# Patient Record
Sex: MALE | Race: WHITE | Employment: FULL TIME | URBAN - METROPOLITAN AREA
[De-identification: names, ages, dates, MRNs, and addresses within clinical notes are randomized per-mention and may not be internally consistent; named-entity substitution may affect disease eponyms.]

---

## 2019-07-23 ENCOUNTER — HOSPITAL ENCOUNTER (EMERGENCY)
Facility: CLINIC | Age: 34
Discharge: HOME | End: 2019-07-23
Attending: EMERGENCY MEDICINE | Admitting: EMERGENCY MEDICINE
Payer: COMMERCIAL

## 2019-07-23 VITALS
HEIGHT: 77 IN | OXYGEN SATURATION: 100 % | WEIGHT: 242 LBS | RESPIRATION RATE: 15 BRPM | DIASTOLIC BLOOD PRESSURE: 71 MMHG | TEMPERATURE: 98 F | BODY MASS INDEX: 28.57 KG/M2 | SYSTOLIC BLOOD PRESSURE: 131 MMHG | HEART RATE: 74 BPM

## 2019-07-23 DIAGNOSIS — T78.40XA ALLERGIC REACTION, INITIAL ENCOUNTER: ICD-10-CM

## 2019-07-23 PROCEDURE — 96375 TX/PRO/DX INJ NEW DRUG ADDON: CPT

## 2019-07-23 PROCEDURE — 25000128 H RX IP 250 OP 636: Performed by: EMERGENCY MEDICINE

## 2019-07-23 PROCEDURE — 99285 EMERGENCY DEPT VISIT HI MDM: CPT | Mod: 25

## 2019-07-23 PROCEDURE — 96374 THER/PROPH/DIAG INJ IV PUSH: CPT

## 2019-07-23 PROCEDURE — 96361 HYDRATE IV INFUSION ADD-ON: CPT

## 2019-07-23 PROCEDURE — 96372 THER/PROPH/DIAG INJ SC/IM: CPT

## 2019-07-23 RX ORDER — EPINEPHRINE 0.3 MG/.3ML
0.3 INJECTION SUBCUTANEOUS
Qty: 1 EACH | Refills: 0 | Status: SHIPPED | OUTPATIENT
Start: 2019-07-23 | End: 2019-09-13

## 2019-07-23 RX ORDER — DIPHENHYDRAMINE HYDROCHLORIDE 50 MG/ML
50 INJECTION INTRAMUSCULAR; INTRAVENOUS ONCE
Status: COMPLETED | OUTPATIENT
Start: 2019-07-23 | End: 2019-07-23

## 2019-07-23 RX ORDER — EPINEPHRINE 1 MG/ML
.3-.5 INJECTION, SOLUTION, CONCENTRATE INTRAVENOUS ONCE
Status: COMPLETED | OUTPATIENT
Start: 2019-07-23 | End: 2019-07-23

## 2019-07-23 RX ORDER — METHYLPREDNISOLONE SODIUM SUCCINATE 125 MG/2ML
125 INJECTION, POWDER, LYOPHILIZED, FOR SOLUTION INTRAMUSCULAR; INTRAVENOUS ONCE
Status: COMPLETED | OUTPATIENT
Start: 2019-07-23 | End: 2019-07-23

## 2019-07-23 RX ORDER — PREDNISONE 20 MG/1
TABLET ORAL
Qty: 10 TABLET | Refills: 0 | Status: SHIPPED | OUTPATIENT
Start: 2019-07-23 | End: 2019-09-13

## 2019-07-23 RX ADMIN — DIPHENHYDRAMINE HYDROCHLORIDE 50 MG: 50 INJECTION, SOLUTION INTRAMUSCULAR; INTRAVENOUS at 11:45

## 2019-07-23 RX ADMIN — EPINEPHRINE 0.36 MG: 1 INJECTION INTRAMUSCULAR; INTRAVENOUS; SUBCUTANEOUS at 11:40

## 2019-07-23 RX ADMIN — METHYLPREDNISOLONE SODIUM SUCCINATE 125 MG: 125 INJECTION, POWDER, FOR SOLUTION INTRAMUSCULAR; INTRAVENOUS at 11:49

## 2019-07-23 RX ADMIN — SODIUM CHLORIDE 1000 ML: 9 INJECTION, SOLUTION INTRAVENOUS at 11:51

## 2019-07-23 ASSESSMENT — MIFFLIN-ST. JEOR: SCORE: 2165.08

## 2019-07-23 ASSESSMENT — ENCOUNTER SYMPTOMS
TROUBLE SWALLOWING: 0
DIARRHEA: 0
SHORTNESS OF BREATH: 0
NAUSEA: 1
VOMITING: 0

## 2019-07-23 NOTE — DISCHARGE INSTRUCTIONS
Discharge Instructions  Allergic Reaction    An allergic reaction can result in a rash, itching, swelling, watery eyes, or a runny nose. A serious reaction can cause swelling of your mouth or throat, or difficulty breathing (wheezing). The most serious allergy is called anaphylaxis, and can be life-threatening. Many allergies result in hives, also called urticaria.       An allergy happens when the body s natural defense system (immune system) overreacts to something. The thing that triggers your allergic reaction is called an allergen. The first time you are exposed to your allergen, you may not have any reaction, but the body makes a protein called an antibody. The antibody lets the body recognize and remember the allergen.  Every time you are exposed to your allergen you get more antibody and your reaction can be more severe.      Generally, every Emergency Department visit should have a follow-up clinic visit with either a primary or a specialty clinic/provider. Please follow-up as instructed by your emergency provider today.    Call 911 if you have:  Swelling of the lips, tongue or throat.  Hoarse voice, drooling or trouble breathing.  Chest pain or shortness of breath.  Fainting or unconsciousness.    What can I do to help myself?  If you know what caused your allergy, do not touch it, throw any of it away, and tell others not to have it around you. Wear a medical alert bracelet with a name of your allergen on it.  If you do not know what you are allergic to, keep a journal of everything that you are exposed to (foods, soaps, medicines, etc.). Take this with you when you follow up with your primary provider or specialist (Allergist). This may help determine what is causing the allergic reaction.  Take any medicines that are prescribed.  Antihistamines can decrease rash or itching. You may use Benadryl  (diphenhydramine) for rash or itching according to package directions, or use a prescription antihistamine as  recommended by your provider.  For significant allergic reactions, you may have been given a prescription for an epinephrine (adrenaline) auto injector. Carry this with you at all times! Use it if you are having any symptoms of anaphylaxis.  Do not be afraid to use it. Return to the Emergency Department if you use your auto injector, call 911 if it does not resolve the symptoms. It is only meant to buy time until you can get to the Emergency Department!  If you were given a prescription for medicine here today, be sure to read all of the information (including the package insert) that comes with your prescription.  This will include important information about the medicine, its side effects, and any warnings that you need to know about.  The pharmacist who fills the prescription can provide more information and answer questions you may have about the medicine.  If you have questions or concerns that the pharmacist cannot address, please call or return to the Emergency Department.   Remember that you can always come back to the Emergency Department if you are not able to see your regular provider in the amount of time listed above, if you get any new symptoms, or if there is anything that worries you.

## 2019-07-23 NOTE — ED TRIAGE NOTES
Patient has been on Pantoprazole for one week.  Noted swelling to tongue and difficulty swallowing over the past 24 hours.      ABCs intact.  Alert and oriented x 3.

## 2019-07-23 NOTE — ED AVS SNAPSHOT
[facility] Emergency Department  [address]  [facility] [address]  Phone:  [phone]  Fax:  [phone]                                    Giovanni Parks   MRN: [id number]    Department:  [facility] Emergency Department   Date of Visit:  7/23/2019           After Visit Summary Signature Page    I have received my discharge instructions, and my questions have been answered. I have discussed any challenges I see with this plan with the nurse or doctor.    ..........................................................................................................................................  Patient/Patient Representative Signature      ..........................................................................................................................................  Patient Representative Print Name and Relationship to Patient    ..................................................               ................................................  Date                                   Time    ..........................................................................................................................................  Reviewed by Signature/Title    ...................................................              ..............................................  Date                                               Time          22EPIC Rev 08/18

## 2019-07-23 NOTE — ED PROVIDER NOTES
"  History     Chief Complaint:    Allergic Reaction      HPI   Giovanni Parks is a 33 year old male who presents to the ED for evaluation of a suspected allergic reaction. The patient states that he started taking pantoprazole a week ago for persistent GERD symptoms. About 24 hours ago, he states that he started to develop slight swelling in his tongue and a mild discomfort in his throat. His symptoms persisted today so he presented to the ED for concerns that he may be having an allergic reaction to his pantoprazole. He also complains of some nausea. He denies any difficulty swallowing, shortness of breath, vomiting, or diarrhea. He notes that he went camping this past weekend but otherwise denies using/consuming any new foods, soaps, lotions, or other potential allergens. Patient reports that he does not take ACE inhibitors.     Allergies:  The patient has no known drug allergies.     Medications:    Pantoprazole  Zantac    Past Medical History:    GERD  HLD  Lumbar disc disease  Cervical disc disease    Past Surgical History:    C7 disc fusion    Family History:    Father: HLD, HTN  Mother: DM, COPD, HTN    Social History:  Negative for tobacco use.  Negative for alcohol use.  Marital Status:        Review of Systems   HENT: Negative for trouble swallowing.         Tongue swelling   Respiratory: Negative for shortness of breath.    Gastrointestinal: Positive for nausea. Negative for diarrhea and vomiting.   All other systems reviewed and are negative.    Physical Exam   First Vitals:  BP: (!) 138/91  Pulse: 76  Heart Rate: 73  Temp: 98  F (36.7  C)  Resp: 20  Height: 195.6 cm (6' 5\")  Weight: 109.8 kg (242 lb)  SpO2: 100 %      Physical Exam    VS: Reviewed per above  HENT: Mucous membranes moist, no stridor, no nuchal rigidity, tolerating secretions, normal phonation, no objective tongue or posterior oropharyngeal swelling  EYES: sclera anicteric  CV: Rate as noted,  regular rhythm.   RESP: Effort " normal. Breath sounds are normal bilaterally.  GI: no tenderness/rebound/guarding, not distended.  NEURO: Alert, moving all extremities  MSK: No deformity of the extremities  SKIN: Warm and dry.  No rash.    Emergency Department Course   Interventions:  1140 Adrenaline 0.36 mg IM  1145 Benadryl 50 mg IV  1148 Zantac 50 mg IV  1149 Solu-medrol 125 mg IV  1151 NS 1,000 mLs IV    Emergency Department Course:  Nursing notes and vitals reviewed. (1128) I performed an exam of the patient as documented above.     IV inserted. Medicine administered as documented above.     1243 I rechecked the patient.    Findings and plan explained to the Patient. Patient discharged home with instructions regarding supportive care, medications, and reasons to return. The importance of close follow-up was reviewed. The patient was prescribed epinephrine and prednisone.     I personally reviewed the laboratory results with the Patient and answered all related questions prior to discharge.     Impression & Plan    Medical Decision Making:  Patient presents to the ER due to concern of tongue swelling, throat tightness, nausea, dizziness.  Initial vital signs within normal limits.  I do not objectively see any signs of angioedema or oropharyngeal swelling however given patient concern, I did treat this as significant allergic reaction versus anaphylaxis.  Given potential for airway involvement as well as GI symptoms and possible orthostasis, he would meet criteria for anaphylaxis.  Nevertheless he had easy work of breathing and pretty benign appearance on exam.  He was given IM epinephrine, IV Benadryl/Zantac/Solu-Medrol/IV fluids.  Symptoms improved after these interventions.  Offered to observe for 6 hours in the rare event of rebound reaction however he preferred to go home with EpiPen and prednisone prescriptions and return via EMS should symptoms worsen.  Close return precautions discussed prior to discharge      Diagnosis:    ICD-10-CM     1. Allergic reaction, initial encounter T78.40XA        Disposition:  discharged to home    Discharge Medications:     Medication List      Started    EPINEPHrine 0.3 MG/0.3ML injection 2-pack  Commonly known as:  EPIPEN/ADRENACLICK/or ANY BX GENERIC EQUIV  0.3 mg, Intramuscular, ONCE PRN     predniSONE 20 MG tablet  Commonly known as:  DELTASONE  Take two tablets (= 40mg) each day for 5 (five) days            Scribe Disclosure:  I,  Bridger Avila, am serving as a scribe on 7/23/2019 at 11:28 AM to personally document services performed by Vinny Melendez MD based on my observations and the provider's statements to me.        Bridger Avila  7/23/2019   [facility] EMERGENCY DEPARTMENT       Vinny Melendez MD  07/23/19 2843

## 2019-07-23 NOTE — LETTER
07/23/19      To Whom it may concern:    Liz Parks was in our Emergency Department today, 07/23/19 with a patient who needed their assistance.  Please excuse them from work.      Sincerely,

## 2019-07-23 NOTE — ED NOTES
"Pt stating that inflammation in throat/mouth \"is pretty much gone.\" No interventions needed at this time.  "

## 2019-07-25 ENCOUNTER — HOSPITAL ENCOUNTER (EMERGENCY)
Facility: CLINIC | Age: 34
Discharge: HOME | End: 2019-07-25
Attending: EMERGENCY MEDICINE | Admitting: EMERGENCY MEDICINE
Payer: COMMERCIAL

## 2019-07-25 VITALS
BODY MASS INDEX: 29.2 KG/M2 | DIASTOLIC BLOOD PRESSURE: 87 MMHG | SYSTOLIC BLOOD PRESSURE: 135 MMHG | RESPIRATION RATE: 16 BRPM | TEMPERATURE: 98.3 F | HEART RATE: 64 BPM | OXYGEN SATURATION: 99 % | WEIGHT: 246.25 LBS

## 2019-07-25 DIAGNOSIS — R10.12 ABDOMINAL PAIN, LEFT UPPER QUADRANT: ICD-10-CM

## 2019-07-25 DIAGNOSIS — R42 DIZZINESS: ICD-10-CM

## 2019-07-25 LAB
ALBUMIN SERPL-MCNC: 4.2 G/DL (ref 3.4–5)
ALP SERPL-CCNC: 43 U/L (ref 40–150)
ALT SERPL W P-5'-P-CCNC: 26 U/L (ref 0–70)
ANION GAP SERPL CALCULATED.3IONS-SCNC: 5 MMOL/L (ref 3–14)
AST SERPL W P-5'-P-CCNC: 12 U/L (ref 0–45)
BASOPHILS # BLD AUTO: 0 10E9/L (ref 0–0.2)
BASOPHILS NFR BLD AUTO: 0.1 %
BILIRUB SERPL-MCNC: 0.5 MG/DL (ref 0.2–1.3)
BUN SERPL-MCNC: 12 MG/DL (ref 7–30)
CALCIUM SERPL-MCNC: 9.1 MG/DL (ref 8.5–10.1)
CHLORIDE SERPL-SCNC: 108 MMOL/L (ref 94–109)
CO2 SERPL-SCNC: 27 MMOL/L (ref 20–32)
CREAT SERPL-MCNC: 0.89 MG/DL (ref 0.66–1.25)
DIFFERENTIAL METHOD BLD: ABNORMAL
EOSINOPHIL # BLD AUTO: 0 10E9/L (ref 0–0.7)
EOSINOPHIL NFR BLD AUTO: 0 %
ERYTHROCYTE [DISTWIDTH] IN BLOOD BY AUTOMATED COUNT: 12.7 % (ref 10–15)
GFR SERPL CREATININE-BSD FRML MDRD: >90 ML/MIN/{1.73_M2}
GLUCOSE SERPL-MCNC: 103 MG/DL (ref 70–99)
HCT VFR BLD AUTO: 41.7 % (ref 40–53)
HGB BLD-MCNC: 13.7 G/DL (ref 13.3–17.7)
IMM GRANULOCYTES # BLD: 0 10E9/L (ref 0–0.4)
IMM GRANULOCYTES NFR BLD: 0.4 %
LIPASE SERPL-CCNC: 161 U/L (ref 73–393)
LYMPHOCYTES # BLD AUTO: 1.3 10E9/L (ref 0.8–5.3)
LYMPHOCYTES NFR BLD AUTO: 12.4 %
MCH RBC QN AUTO: 29.7 PG (ref 26.5–33)
MCHC RBC AUTO-ENTMCNC: 32.9 G/DL (ref 31.5–36.5)
MCV RBC AUTO: 91 FL (ref 78–100)
MONOCYTES # BLD AUTO: 0.4 10E9/L (ref 0–1.3)
MONOCYTES NFR BLD AUTO: 4 %
NEUTROPHILS # BLD AUTO: 8.8 10E9/L (ref 1.6–8.3)
NEUTROPHILS NFR BLD AUTO: 83.1 %
NRBC # BLD AUTO: 0 10*3/UL
NRBC BLD AUTO-RTO: 0 /100
PLATELET # BLD AUTO: 285 10E9/L (ref 150–450)
POTASSIUM SERPL-SCNC: 4.2 MMOL/L (ref 3.4–5.3)
PROT SERPL-MCNC: 7.3 G/DL (ref 6.8–8.8)
RBC # BLD AUTO: 4.61 10E12/L (ref 4.4–5.9)
SODIUM SERPL-SCNC: 140 MMOL/L (ref 133–144)
TROPONIN I SERPL-MCNC: <0.015 UG/L (ref 0–0.04)
WBC # BLD AUTO: 10.6 10E9/L (ref 4–11)

## 2019-07-25 PROCEDURE — 85025 COMPLETE CBC W/AUTO DIFF WBC: CPT | Performed by: EMERGENCY MEDICINE

## 2019-07-25 PROCEDURE — 96361 HYDRATE IV INFUSION ADD-ON: CPT

## 2019-07-25 PROCEDURE — 25000132 ZZH RX MED GY IP 250 OP 250 PS 637: Performed by: EMERGENCY MEDICINE

## 2019-07-25 PROCEDURE — 25000128 H RX IP 250 OP 636: Performed by: EMERGENCY MEDICINE

## 2019-07-25 PROCEDURE — 96374 THER/PROPH/DIAG INJ IV PUSH: CPT

## 2019-07-25 PROCEDURE — 83690 ASSAY OF LIPASE: CPT | Performed by: EMERGENCY MEDICINE

## 2019-07-25 PROCEDURE — 99284 EMERGENCY DEPT VISIT MOD MDM: CPT | Mod: 25

## 2019-07-25 PROCEDURE — 93005 ELECTROCARDIOGRAM TRACING: CPT

## 2019-07-25 PROCEDURE — 84484 ASSAY OF TROPONIN QUANT: CPT | Performed by: EMERGENCY MEDICINE

## 2019-07-25 PROCEDURE — 80053 COMPREHEN METABOLIC PANEL: CPT | Performed by: EMERGENCY MEDICINE

## 2019-07-25 PROCEDURE — 25000125 ZZHC RX 250: Performed by: EMERGENCY MEDICINE

## 2019-07-25 RX ADMIN — LIDOCAINE HYDROCHLORIDE 30 ML: 20 SOLUTION ORAL; TOPICAL at 16:06

## 2019-07-25 RX ADMIN — FAMOTIDINE 20 MG: 10 INJECTION, SOLUTION INTRAVENOUS at 15:14

## 2019-07-25 RX ADMIN — SODIUM CHLORIDE 1000 ML: 9 INJECTION, SOLUTION INTRAVENOUS at 14:58

## 2019-07-25 ASSESSMENT — ENCOUNTER SYMPTOMS
PALPITATIONS: 1
LIGHT-HEADEDNESS: 1
ABDOMINAL PAIN: 1
BLOOD IN STOOL: 0
ABDOMINAL DISTENTION: 1
VOMITING: 0
NAUSEA: 1

## 2019-07-25 NOTE — ED AVS SNAPSHOT
[facility] Emergency Department  [address]  [facility] [address]  Phone:  [phone]  Fax:  [phone]                                    Giovanni Parks   MRN: [id number]    Department:  [facility] Emergency Department   Date of Visit:  7/25/2019           After Visit Summary Signature Page    I have received my discharge instructions, and my questions have been answered. I have discussed any challenges I see with this plan with the nurse or doctor.    ..........................................................................................................................................  Patient/Patient Representative Signature      ..........................................................................................................................................  Patient Representative Print Name and Relationship to Patient    ..................................................               ................................................  Date                                   Time    ..........................................................................................................................................  Reviewed by Signature/Title    ...................................................              ..............................................  Date                                               Time          22EPIC Rev 08/18

## 2019-07-25 NOTE — ED PROVIDER NOTES
History     Chief Complaint:  Lightheadedness & Abdominal Pain    HPI   Giovanni Parks is a 33 year old male, with a history of GERD, who presents to the ED for evaluation of lightheadedness and abdominal pain. The patient reports he developed sudden onset of lightheadedness with palpitations and felt flushed while at work today occurring in conjunction with worsening abdominal pain. Abdominal pain that is located to his LUQ, higher than usual today. His abdomen feels swollen with associated nausea. He only had cheese crackers and yogurt for lunch today. He last had one beer last night. The patient notes he has been following with Dr. Paige Lockhart, of GI, for the past 1.5 months. He has had a CT and upper endoscopy performed. He also has lost about 10lb since the onset of his abdominal pain due to being unable to eat or drink because this worsens the pain. He did call the GI office and has updated Dr. Lockhart's assistant today. The patient denies any chest pain, vomiting, black/tarry stool, blood in stool, or urinary symptoms. He denies history of abdominal surgeries. On review of patient's record, he has been following with GI for GERD. His last appointment was on 7/5 where he was prescribed Pantoprazole. If he felt that his symptoms improve, the Pantoprazole dosage would be reduced. He was advised to try Gas-X for recurrent symptoms. He was evaluated at [facility] ED 2 days ago for an allergic reaction to Pantoprazole after taking it for less than a week. He was prescribed 20mg Prednisone and Epipen. He has not had another reaction since stopping the Pantoprazole.     CT Abdomen Pelvis w Contrast, 6/7/2019  CONCLUSION:   1.  Normal examination of the abdomen and pelvis.    GI Upper Endoscopy, 6/21/2019  Impression: Z-line irregular, 44 cm from the incisors. Biopsied. Erythematous mucosa in the antrum. Biopsied. Normal examined duodenum. Biopsied.  Lionel Ocasio MD    Allergies:  Norco: GI disturbance     Levofloxacin: bilateral knee pain & locking   Protonix: swelling & difficulty breathing      Medications:    Zantac   Protonix   Valium  Prednisone   Ativan  Imitrex     Past Medical History:    GERD  HLD  Lumbar disc disease  Cervical disc disease  MRSA    Past Surgical History:    Cervical fusion, C7    Family History:    HLD, HTN: father   Diabetes, COPD, HTN: mother     Social History:  Smoking status: Never smoker    Alcohol use: Yes, one beer last night   Presents to ED alone    Marital Status:   [2]     Review of Systems   Cardiovascular: Positive for palpitations. Negative for chest pain.   Gastrointestinal: Positive for abdominal distention, abdominal pain and nausea. Negative for blood in stool and vomiting.   Neurological: Positive for light-headedness.   All other systems reviewed and are negative.    Physical Exam     Patient Vitals for the past 24 hrs:   BP Temp Temp src Pulse Heart Rate Resp SpO2 Weight   07/25/19 1700 -- -- -- -- -- 16 -- --   07/25/19 1630 135/87 -- -- 64 -- -- 99 % --   07/25/19 1615 128/85 -- -- 71 -- -- -- --   07/25/19 1600 132/80 -- -- 66 -- -- 98 % --   07/25/19 1545 127/82 -- -- 57 64 -- 98 % --   07/25/19 1530 129/76 -- -- 56 -- -- 99 % --   07/25/19 1515 131/83 -- -- 70 70 24 97 % --   07/25/19 1500 139/85 -- -- 66 -- -- 100 % --   07/25/19 1416 (!) 141/100 98.3  F (36.8  C) Temporal -- 95 18 100 % 111.7 kg (246 lb 4.1 oz)     Physical Exam  General:              Well-nourished              Speaking in full sentences  Eyes:              Conjunctiva without injection or scleral icterus  ENT:              Moist mucous membranes              Nares patent              Pinnae normal  Neck:              Full ROM              No stiffness appreciated  Resp:              Lungs CTAB              No crackles, wheezing or audible rubs              Good air movement  CV:                    Normal rate, regular rhythm              S1 and S2 present              No murmur,  gallop or rub  GI:              BS present              Abdomen soft without distention              Tenderness to palpation to LUQ              No RUQ or epigastric tenderness              No lower abdominal tenderness              No guarding or rebound tenderness  Skin:              Warm, dry, well perfused              No rashes or open wounds on exposed skin  MSK:              Moves all extremities              No focal deformities or swelling  Neuro:              Alert              Answers questions appropriately              Moves all extremities equally              Gait stable  Psych:              Normal affect, normal mood    Emergency Department Course     ECG (14:44:51):  Rate 72 bpm. NJ interval 142. QRS duration 100. QT/QTc 384/420. P-R-T axes 43 26 35. Normal sinus rhythm. Normal ECG. No significant change compared to EKG dated 3/28/12. Interpreted at 1503 by Arnie Jacobo MD.    Laboratory:  Laboratory findings were communicated with the patient who voiced understanding of the findings.    Troponin I (1457): <0.015    Lipase: 161    CBC: o/w WNL (WBC 10.6, HGB 13.7, )  CMP: Glucose 103(H), o/w WNL (Creatinine 0.89)     Interventions:  1458: NS 1L Bolus IV  1514: Pepcid 20mg IV  1606: GI Cocktail - Mylanta/Maalox 15 mL, Viscous Lidocaine 2% 15 mL, 30 mLs PO    Emergency Department Course:  Past medical records, nursing notes, and vitals reviewed.  1455: I performed an exam of the patient and obtained history, as documented above.    1457: IV inserted and blood drawn.     1555: I rechecked the patient. Explained findings to patient.    1638: Per nurse s report, the patient got up from bed and reports he feels fine. He is eating crackers.      1653: I rechecked the patient. Patient reports he has an appointment with GI tomorrow.     Findings and plan explained to the Patient. Patient discharged home with instructions regarding supportive care, medications, and reasons to return. The  importance of close follow-up was reviewed.     Impression & Plan      Medical Decision Making:  Giovanni Parks is a 33-year-old male presenting to the emergency department for evaluation of abdominal pain and dizziness.  VS on presentation reveal elevated BP, though otherwise are unremarkable.  Previous records reviewed including gastroenterology follow-up through [facility] where patient has been diagnosed with reflux esophagitis as well as left upper quadrant pain likely secondary to reflux.  He is undergone EGD as well as CT of the abdomen pelvis.  With regards to patient's symptoms, he notes exacerbation of pain to a relatively similar location and of similar character to prior flares.  Acknowledging his recent history, I am concerned his current exacerbation may be secondary to use of prednisone for treatment of his recent allergic reaction.  In the absence of ongoing allergic symptoms, as well as given the duration of time elapsed since his initial reaction, understanding the risks and benefits of this medication, I feel this can be safely discontinued and may alleviate some of his symptoms.  He has previously been intolerant of pantoprazole, for which I suggested he try Zantac.  He was also asked to try Gas-X at the recommendation of gastroenterology.  Following the above interventions in the ER, he was noting improvements in symptoms.  Repeat abdominal exam is soft without peritoneal findings and at this time, with reasonable clinical certainty I feel further advanced imaging with CT scan can be deferred safely.  His current exam is not consistent with perforation nor obstruction.  He has not noted the presence of any blood in his stool.  Labs demonstrate unremarkable hemoglobin, and unremarkable LFTs.  Lipase within normal limits arguing against pancreatitis.  With regards to patient's dizziness, this may be secondary to increased pain versus volume depletion in the setting of limited oral intake.   After IV fluids, he was noting significant improvement in symptoms.  EKG demonstrates sinus rhythm without findings of acute dysrhythmia, prolonged QTC, Brugada syndrome, no evidence of WPW.  He was amatory in the ED independently.  He was able to tolerate p.o.  Results and clinical impression were discussed with the patient.  At this time, I do feel he is stable for discharge home with close gastroenterology follow-up.  He will return to the ER with worsening pain, vomiting, inability to tolerate p.o., or any other concerns.  All of his questions were answered prior to discharge.    Diagnosis:    ICD-10-CM   1. Abdominal pain, left upper quadrant R10.12   2. Dizziness R42     Disposition: Patient discharged to home     Bettie Mathis  7/25/2019   [facility] EMERGENCY DEPARTMENT    Scribe Disclosure:  I, Bettie Mathis, am serving as a scribe at 2:55 PM on 7/25/2019 to document services personally performed by Arnie Jacobo MD based on my observations and the provider's statements to me.        Arnie Jacobo MD  07/25/19 1490

## 2019-07-25 NOTE — ED TRIAGE NOTES
"A&O x4, ABCs intact. Pt presents with left upper abdominal pain, and states \"I feel like I'm going to faint.\" Pt denies chest pain. Pt also has nausea. States he is SOB.  "

## 2019-07-25 NOTE — ED NOTES
Pt ambulated to restroom independently with standby assist. Reports improved dizziness, denies lightheadedness. Steady gait.

## 2019-07-26 LAB — INTERPRETATION ECG - MUSE: NORMAL

## 2019-09-13 ENCOUNTER — HOSPITAL ENCOUNTER (INPATIENT)
Facility: CLINIC | Age: 34
LOS: 2 days | Discharge: HOME | DRG: 872 | End: 2019-09-15
Attending: EMERGENCY MEDICINE | Admitting: INTERNAL MEDICINE
Payer: COMMERCIAL

## 2019-09-13 ENCOUNTER — APPOINTMENT (OUTPATIENT)
Dept: ULTRASOUND IMAGING | Facility: CLINIC | Age: 34
DRG: 872 | End: 2019-09-13
Attending: EMERGENCY MEDICINE
Payer: COMMERCIAL

## 2019-09-13 ENCOUNTER — APPOINTMENT (OUTPATIENT)
Dept: CT IMAGING | Facility: CLINIC | Age: 34
DRG: 872 | End: 2019-09-13
Attending: EMERGENCY MEDICINE
Payer: COMMERCIAL

## 2019-09-13 DIAGNOSIS — A41.51 SEPSIS DUE TO ESCHERICHIA COLI (H): Primary | ICD-10-CM

## 2019-09-13 DIAGNOSIS — N12 PYELONEPHRITIS: ICD-10-CM

## 2019-09-13 PROBLEM — A41.9 SEPSIS (H): Status: ACTIVE | Noted: 2019-09-13

## 2019-09-13 LAB
ALBUMIN SERPL-MCNC: 4.1 G/DL (ref 3.4–5)
ALBUMIN UR-MCNC: 30 MG/DL
ALP SERPL-CCNC: 54 U/L (ref 40–150)
ALT SERPL W P-5'-P-CCNC: 28 U/L (ref 0–70)
ANION GAP SERPL CALCULATED.3IONS-SCNC: 4 MMOL/L (ref 3–14)
APPEARANCE UR: ABNORMAL
AST SERPL W P-5'-P-CCNC: 13 U/L (ref 0–45)
BASOPHILS # BLD AUTO: 0 10E9/L (ref 0–0.2)
BASOPHILS NFR BLD AUTO: 0.2 %
BILIRUB DIRECT SERPL-MCNC: 0.1 MG/DL (ref 0–0.2)
BILIRUB SERPL-MCNC: 0.9 MG/DL (ref 0.2–1.3)
BILIRUB UR QL STRIP: NEGATIVE
BUN SERPL-MCNC: 10 MG/DL (ref 7–30)
CALCIUM SERPL-MCNC: 8.8 MG/DL (ref 8.5–10.1)
CHLORIDE SERPL-SCNC: 104 MMOL/L (ref 94–109)
CO2 SERPL-SCNC: 29 MMOL/L (ref 20–32)
COLOR UR AUTO: YELLOW
CREAT SERPL-MCNC: 1.09 MG/DL (ref 0.66–1.25)
DIFFERENTIAL METHOD BLD: ABNORMAL
EOSINOPHIL # BLD AUTO: 0 10E9/L (ref 0–0.7)
EOSINOPHIL NFR BLD AUTO: 0.1 %
ERYTHROCYTE [DISTWIDTH] IN BLOOD BY AUTOMATED COUNT: 12.4 % (ref 10–15)
GFR SERPL CREATININE-BSD FRML MDRD: 89 ML/MIN/{1.73_M2}
GLUCOSE SERPL-MCNC: 107 MG/DL (ref 70–99)
GLUCOSE UR STRIP-MCNC: NEGATIVE MG/DL
HCT VFR BLD AUTO: 42.2 % (ref 40–53)
HGB BLD-MCNC: 14.3 G/DL (ref 13.3–17.7)
HGB UR QL STRIP: ABNORMAL
IMM GRANULOCYTES # BLD: 0.1 10E9/L (ref 0–0.4)
IMM GRANULOCYTES NFR BLD: 0.5 %
KETONES UR STRIP-MCNC: NEGATIVE MG/DL
LACTATE SERPL-SCNC: 0.8 MMOL/L (ref 0.4–2)
LEUKOCYTE ESTERASE UR QL STRIP: ABNORMAL
LIPASE SERPL-CCNC: 178 U/L (ref 73–393)
LYMPHOCYTES # BLD AUTO: 1.4 10E9/L (ref 0.8–5.3)
LYMPHOCYTES NFR BLD AUTO: 7.8 %
MCH RBC QN AUTO: 30.2 PG (ref 26.5–33)
MCHC RBC AUTO-ENTMCNC: 33.9 G/DL (ref 31.5–36.5)
MCV RBC AUTO: 89 FL (ref 78–100)
MONOCYTES # BLD AUTO: 1 10E9/L (ref 0–1.3)
MONOCYTES NFR BLD AUTO: 5.2 %
MUCOUS THREADS #/AREA URNS LPF: PRESENT /LPF
NEUTROPHILS # BLD AUTO: 15.6 10E9/L (ref 1.6–8.3)
NEUTROPHILS NFR BLD AUTO: 86.2 %
NITRATE UR QL: NEGATIVE
NRBC # BLD AUTO: 0 10*3/UL
NRBC BLD AUTO-RTO: 0 /100
PH UR STRIP: 6.5 PH (ref 5–7)
PLATELET # BLD AUTO: 269 10E9/L (ref 150–450)
POTASSIUM SERPL-SCNC: 3.7 MMOL/L (ref 3.4–5.3)
PROT SERPL-MCNC: 7.4 G/DL (ref 6.8–8.8)
RBC # BLD AUTO: 4.73 10E12/L (ref 4.4–5.9)
RBC #/AREA URNS AUTO: 132 /HPF (ref 0–2)
SODIUM SERPL-SCNC: 137 MMOL/L (ref 133–144)
SOURCE: ABNORMAL
SP GR UR STRIP: 1.02 (ref 1–1.03)
SQUAMOUS #/AREA URNS AUTO: 1 /HPF (ref 0–1)
TRANS CELLS #/AREA URNS HPF: 1 /HPF (ref 0–1)
UROBILINOGEN UR STRIP-MCNC: NORMAL MG/DL (ref 0–2)
WBC # BLD AUTO: 18.2 10E9/L (ref 4–11)
WBC #/AREA URNS AUTO: >182 /HPF (ref 0–5)

## 2019-09-13 PROCEDURE — 25000131 ZZH RX MED GY IP 250 OP 636 PS 637: Performed by: INTERNAL MEDICINE

## 2019-09-13 PROCEDURE — 99285 EMERGENCY DEPT VISIT HI MDM: CPT | Mod: 25

## 2019-09-13 PROCEDURE — 87491 CHLMYD TRACH DNA AMP PROBE: CPT | Performed by: EMERGENCY MEDICINE

## 2019-09-13 PROCEDURE — 96365 THER/PROPH/DIAG IV INF INIT: CPT

## 2019-09-13 PROCEDURE — 36415 COLL VENOUS BLD VENIPUNCTURE: CPT | Performed by: EMERGENCY MEDICINE

## 2019-09-13 PROCEDURE — 87591 N.GONORRHOEAE DNA AMP PROB: CPT | Performed by: EMERGENCY MEDICINE

## 2019-09-13 PROCEDURE — 25000128 H RX IP 250 OP 636: Performed by: EMERGENCY MEDICINE

## 2019-09-13 PROCEDURE — 99222 1ST HOSP IP/OBS MODERATE 55: CPT | Mod: AI | Performed by: INTERNAL MEDICINE

## 2019-09-13 PROCEDURE — 96361 HYDRATE IV INFUSION ADD-ON: CPT

## 2019-09-13 PROCEDURE — 96375 TX/PRO/DX INJ NEW DRUG ADDON: CPT

## 2019-09-13 PROCEDURE — 25800030 ZZH RX IP 258 OP 636: Performed by: INTERNAL MEDICINE

## 2019-09-13 PROCEDURE — 83605 ASSAY OF LACTIC ACID: CPT | Performed by: EMERGENCY MEDICINE

## 2019-09-13 PROCEDURE — 87086 URINE CULTURE/COLONY COUNT: CPT | Performed by: EMERGENCY MEDICINE

## 2019-09-13 PROCEDURE — 25000128 H RX IP 250 OP 636: Performed by: INTERNAL MEDICINE

## 2019-09-13 PROCEDURE — 83690 ASSAY OF LIPASE: CPT | Performed by: EMERGENCY MEDICINE

## 2019-09-13 PROCEDURE — 87088 URINE BACTERIA CULTURE: CPT | Performed by: EMERGENCY MEDICINE

## 2019-09-13 PROCEDURE — 93976 VASCULAR STUDY: CPT

## 2019-09-13 PROCEDURE — 12000000 ZZH R&B MED SURG/OB

## 2019-09-13 PROCEDURE — 85025 COMPLETE CBC W/AUTO DIFF WBC: CPT | Performed by: EMERGENCY MEDICINE

## 2019-09-13 PROCEDURE — 87186 SC STD MICRODIL/AGAR DIL: CPT | Performed by: EMERGENCY MEDICINE

## 2019-09-13 PROCEDURE — 80048 BASIC METABOLIC PNL TOTAL CA: CPT | Performed by: EMERGENCY MEDICINE

## 2019-09-13 PROCEDURE — 74176 CT ABD & PELVIS W/O CONTRAST: CPT

## 2019-09-13 PROCEDURE — 25000132 ZZH RX MED GY IP 250 OP 250 PS 637: Performed by: EMERGENCY MEDICINE

## 2019-09-13 PROCEDURE — 87040 BLOOD CULTURE FOR BACTERIA: CPT | Performed by: EMERGENCY MEDICINE

## 2019-09-13 PROCEDURE — 80076 HEPATIC FUNCTION PANEL: CPT | Performed by: EMERGENCY MEDICINE

## 2019-09-13 PROCEDURE — 81001 URINALYSIS AUTO W/SCOPE: CPT | Performed by: EMERGENCY MEDICINE

## 2019-09-13 RX ORDER — ACETAMINOPHEN 500 MG
500 TABLET ORAL EVERY 4 HOURS PRN
Status: DISCONTINUED | OUTPATIENT
Start: 2019-09-13 | End: 2019-09-13

## 2019-09-13 RX ORDER — CEFTRIAXONE 1 G/1
1 INJECTION, POWDER, FOR SOLUTION INTRAMUSCULAR; INTRAVENOUS ONCE
Status: COMPLETED | OUTPATIENT
Start: 2019-09-13 | End: 2019-09-13

## 2019-09-13 RX ORDER — IBUPROFEN 600 MG/1
600 TABLET, FILM COATED ORAL EVERY 6 HOURS PRN
Status: DISCONTINUED | OUTPATIENT
Start: 2019-09-13 | End: 2019-09-15 | Stop reason: HOSPADM

## 2019-09-13 RX ORDER — LIDOCAINE 40 MG/G
CREAM TOPICAL
Status: DISCONTINUED | OUTPATIENT
Start: 2019-09-13 | End: 2019-09-14

## 2019-09-13 RX ORDER — SODIUM CHLORIDE 9 MG/ML
INJECTION, SOLUTION INTRAVENOUS CONTINUOUS
Status: DISCONTINUED | OUTPATIENT
Start: 2019-09-13 | End: 2019-09-15

## 2019-09-13 RX ORDER — KETOROLAC TROMETHAMINE 15 MG/ML
15 INJECTION, SOLUTION INTRAMUSCULAR; INTRAVENOUS ONCE
Status: COMPLETED | OUTPATIENT
Start: 2019-09-13 | End: 2019-09-13

## 2019-09-13 RX ORDER — BISACODYL 10 MG
10 SUPPOSITORY, RECTAL RECTAL DAILY PRN
Status: DISCONTINUED | OUTPATIENT
Start: 2019-09-13 | End: 2019-09-15 | Stop reason: HOSPADM

## 2019-09-13 RX ORDER — AMOXICILLIN 250 MG
1 CAPSULE ORAL 2 TIMES DAILY PRN
Status: DISCONTINUED | OUTPATIENT
Start: 2019-09-13 | End: 2019-09-15 | Stop reason: HOSPADM

## 2019-09-13 RX ORDER — ACETAMINOPHEN 500 MG
1000 TABLET ORAL ONCE
Status: DISCONTINUED | OUTPATIENT
Start: 2019-09-13 | End: 2019-09-13

## 2019-09-13 RX ORDER — AMOXICILLIN 250 MG
2 CAPSULE ORAL 2 TIMES DAILY PRN
Status: DISCONTINUED | OUTPATIENT
Start: 2019-09-13 | End: 2019-09-15 | Stop reason: HOSPADM

## 2019-09-13 RX ORDER — LIDOCAINE 40 MG/G
CREAM TOPICAL
Status: DISCONTINUED | OUTPATIENT
Start: 2019-09-13 | End: 2019-09-15 | Stop reason: HOSPADM

## 2019-09-13 RX ORDER — NALOXONE HYDROCHLORIDE 0.4 MG/ML
.1-.4 INJECTION, SOLUTION INTRAMUSCULAR; INTRAVENOUS; SUBCUTANEOUS
Status: DISCONTINUED | OUTPATIENT
Start: 2019-09-13 | End: 2019-09-15 | Stop reason: HOSPADM

## 2019-09-13 RX ORDER — ONDANSETRON 2 MG/ML
4 INJECTION INTRAMUSCULAR; INTRAVENOUS EVERY 6 HOURS PRN
Status: DISCONTINUED | OUTPATIENT
Start: 2019-09-13 | End: 2019-09-15 | Stop reason: HOSPADM

## 2019-09-13 RX ORDER — NITROGLYCERIN 0.4 MG/1
0.4 TABLET SUBLINGUAL EVERY 5 MIN PRN
Status: DISCONTINUED | OUTPATIENT
Start: 2019-09-13 | End: 2019-09-15 | Stop reason: HOSPADM

## 2019-09-13 RX ORDER — ONDANSETRON 4 MG/1
4 TABLET, ORALLY DISINTEGRATING ORAL EVERY 6 HOURS PRN
Status: DISCONTINUED | OUTPATIENT
Start: 2019-09-13 | End: 2019-09-15 | Stop reason: HOSPADM

## 2019-09-13 RX ORDER — ACETAMINOPHEN 325 MG/1
650 TABLET ORAL EVERY 4 HOURS PRN
Status: DISCONTINUED | OUTPATIENT
Start: 2019-09-13 | End: 2019-09-15 | Stop reason: HOSPADM

## 2019-09-13 RX ORDER — CEFTRIAXONE 2 G/1
2 INJECTION, POWDER, FOR SOLUTION INTRAMUSCULAR; INTRAVENOUS EVERY 24 HOURS
Status: DISCONTINUED | OUTPATIENT
Start: 2019-09-14 | End: 2019-09-15

## 2019-09-13 RX ADMIN — ACETAMINOPHEN 500 MG: 500 TABLET, FILM COATED ORAL at 21:16

## 2019-09-13 RX ADMIN — ONDANSETRON 4 MG: 4 TABLET, ORALLY DISINTEGRATING ORAL at 22:53

## 2019-09-13 RX ADMIN — CEFTRIAXONE SODIUM 1 G: 1 INJECTION, POWDER, FOR SOLUTION INTRAMUSCULAR; INTRAVENOUS at 23:18

## 2019-09-13 RX ADMIN — SODIUM CHLORIDE, PRESERVATIVE FREE: 5 INJECTION INTRAVENOUS at 23:15

## 2019-09-13 RX ADMIN — CEFTRIAXONE 1 G: 1 INJECTION, POWDER, FOR SOLUTION INTRAMUSCULAR; INTRAVENOUS at 21:56

## 2019-09-13 RX ADMIN — KETOROLAC TROMETHAMINE 15 MG: 15 INJECTION, SOLUTION INTRAMUSCULAR; INTRAVENOUS at 21:17

## 2019-09-13 RX ADMIN — SODIUM CHLORIDE 1000 ML: 9 INJECTION, SOLUTION INTRAVENOUS at 19:45

## 2019-09-13 ASSESSMENT — MIFFLIN-ST. JEOR: SCORE: 2155.56

## 2019-09-13 ASSESSMENT — ENCOUNTER SYMPTOMS
FREQUENCY: 1
VOMITING: 0
DYSURIA: 1
COUGH: 0
MYALGIAS: 1
BACK PAIN: 1

## 2019-09-14 LAB
ANION GAP SERPL CALCULATED.3IONS-SCNC: 4 MMOL/L (ref 3–14)
BUN SERPL-MCNC: 9 MG/DL (ref 7–30)
CALCIUM SERPL-MCNC: 8.5 MG/DL (ref 8.5–10.1)
CHLORIDE SERPL-SCNC: 107 MMOL/L (ref 94–109)
CO2 SERPL-SCNC: 28 MMOL/L (ref 20–32)
CREAT SERPL-MCNC: 1.04 MG/DL (ref 0.66–1.25)
ERYTHROCYTE [DISTWIDTH] IN BLOOD BY AUTOMATED COUNT: 12.5 % (ref 10–15)
GFR SERPL CREATININE-BSD FRML MDRD: >90 ML/MIN/{1.73_M2}
GLUCOSE SERPL-MCNC: 100 MG/DL (ref 70–99)
HCT VFR BLD AUTO: 39.3 % (ref 40–53)
HGB BLD-MCNC: 12.9 G/DL (ref 13.3–17.7)
MCH RBC QN AUTO: 29.8 PG (ref 26.5–33)
MCHC RBC AUTO-ENTMCNC: 32.8 G/DL (ref 31.5–36.5)
MCV RBC AUTO: 91 FL (ref 78–100)
PLATELET # BLD AUTO: 211 10E9/L (ref 150–450)
POTASSIUM SERPL-SCNC: 4 MMOL/L (ref 3.4–5.3)
RBC # BLD AUTO: 4.33 10E12/L (ref 4.4–5.9)
SODIUM SERPL-SCNC: 139 MMOL/L (ref 133–144)
WBC # BLD AUTO: 11 10E9/L (ref 4–11)

## 2019-09-14 PROCEDURE — 99232 SBSQ HOSP IP/OBS MODERATE 35: CPT | Performed by: INTERNAL MEDICINE

## 2019-09-14 PROCEDURE — 85027 COMPLETE CBC AUTOMATED: CPT | Performed by: INTERNAL MEDICINE

## 2019-09-14 PROCEDURE — 25000132 ZZH RX MED GY IP 250 OP 250 PS 637: Performed by: INTERNAL MEDICINE

## 2019-09-14 PROCEDURE — 12000000 ZZH R&B MED SURG/OB

## 2019-09-14 PROCEDURE — 36415 COLL VENOUS BLD VENIPUNCTURE: CPT | Performed by: INTERNAL MEDICINE

## 2019-09-14 PROCEDURE — 80048 BASIC METABOLIC PNL TOTAL CA: CPT | Performed by: INTERNAL MEDICINE

## 2019-09-14 PROCEDURE — 25000128 H RX IP 250 OP 636: Performed by: INTERNAL MEDICINE

## 2019-09-14 PROCEDURE — 99207 ZZC CDG-MDM COMPONENT: MEETS MODERATE - UP CODED: CPT | Performed by: INTERNAL MEDICINE

## 2019-09-14 RX ORDER — CALCIUM CARBONATE 500 MG/1
500-1000 TABLET, CHEWABLE ORAL 3 TIMES DAILY PRN
Status: DISCONTINUED | OUTPATIENT
Start: 2019-09-14 | End: 2019-09-15 | Stop reason: HOSPADM

## 2019-09-14 RX ADMIN — ACETAMINOPHEN 650 MG: 325 TABLET, FILM COATED ORAL at 08:49

## 2019-09-14 RX ADMIN — CEFTRIAXONE 2 G: 2 INJECTION, POWDER, FOR SOLUTION INTRAMUSCULAR; INTRAVENOUS at 21:08

## 2019-09-14 RX ADMIN — IBUPROFEN 600 MG: 600 TABLET ORAL at 21:18

## 2019-09-14 RX ADMIN — RANITIDINE 150 MG: 150 TABLET ORAL at 21:45

## 2019-09-14 RX ADMIN — IBUPROFEN 600 MG: 600 TABLET ORAL at 09:34

## 2019-09-14 RX ADMIN — ACETAMINOPHEN 650 MG: 325 TABLET, FILM COATED ORAL at 02:21

## 2019-09-14 RX ADMIN — ACETAMINOPHEN 650 MG: 325 TABLET, FILM COATED ORAL at 15:30

## 2019-09-14 ASSESSMENT — ACTIVITIES OF DAILY LIVING (ADL)
ADLS_ACUITY_SCORE: 10
ADLS_ACUITY_SCORE: 10
ADLS_ACUITY_SCORE: 14
ADLS_ACUITY_SCORE: 14
ADLS_ACUITY_SCORE: 10
ADLS_ACUITY_SCORE: 10

## 2019-09-14 NOTE — PROGRESS NOTES
[facility]    Medicine Progress Note - Hospitalist Service       Date of Admission:  9/13/2019  Assessment & Plan     Giovanni Parks is a 33 year old male admitted on 9/13/2019. He has a past medical history of recent cholecystectomy.  He presented to emergency room with flank pain and dysuria.  Found to have UTI and symptoms suspicious for pyelonephritis.     1.  UTI.  Urine culture pending.  Continue IV ceftriaxone.  Start continuous IV fluids.  9/14.  Urine culture remains pending at this time.  Decrease IV fluids to 50 cc an hour.  Continue IV ceftriaxone.     2.  Sepsis.  As evidenced by leukocytosis, tachycardia, and fever.  Source is suspected to be UTI with possible pyelonephritis.  Continue IV ceftriaxone as noted above.  IV fluids.     3.  Hyperglycemia.  Likely acute phase reactant.  No known diabetes.  Follow-up with primary care physician in the outpatient setting.    Diet: Combination Diet Regular Diet Adult    DVT Prophylaxis: Ambulate every shift  Garduno Catheter: not present  Code Status: Full Code      Disposition Plan   Expected discharge: Tomorrow, recommended to prior living arrangement       Jordin Raymond DO  Hospitalist Service  [facility]    ______________________________________________________________________    Interval History   Noticed fevers overnight.  Continues to have some discomfort in groin.  Flank pain much improved today.  Dysuria has also resolved.  Denies chest pain, shortness of breath, nausea, vomiting, or diarrhea.    Data reviewed today: I reviewed all medications, new labs and imaging results over the last 24 hours.     Physical Exam   Vital Signs: Temp: (P) 99.5  F (37.5  C) Temp src: (P) Oral BP: 130/67 Pulse: 84 Heart Rate: 102 Resp: 18 SpO2: 98 % O2 Device: None (Room air)    Weight: 239 lbs 14.4 oz  Gen:  NAD, A&Ox3.  Eyes:  PERRL, sclera anicteric.  OP:  MMM, no lesions.  Neck:  Supple.  CV:  Regular, no murmurs.  Lung:  CTA b/l,  normal effort.  Ab:  +BS, soft.  Skin:  Warm, dry to touch.  No rash.  Ext:  No pitting edema LE b/l.      Data   Recent Labs   Lab 09/14/19  0708 09/13/19 1935   WBC 11.0 18.2*   HGB 12.9* 14.3   MCV 91 89    269    137   POTASSIUM 4.0 3.7   CHLORIDE 107 104   CO2 28 29   BUN 9 10   CR 1.04 1.09   ANIONGAP 4 4   KEARA 8.5 8.8   * 107*   ALBUMIN  --  4.1   PROTTOTAL  --  7.4   BILITOTAL  --  0.9   ALKPHOS  --  54   ALT  --  28   AST  --  13   LIPASE  --  178

## 2019-09-14 NOTE — PROGRESS NOTES
"Vitals: /75 (BP Location: Left arm)   Pulse 69   Temp 97.8  F (36.6  C) (Oral)   Resp 16   Ht 1.956 m (6' 5\")   Wt 108.8 kg (239 lb 14.4 oz)   SpO2 100%   BMI 28.45 kg/m    Tele: NSR  Pain: Denies pain at this time. Pt reports leg pain has resolved  LOC: Pt alert and oriented  Mobility: Pt up in the room independent  Lungs:Lung sounds clear.  :t voiding without difficulty, denies any burning or hesitancy at this time.  GI: Bowel sounds active x4. Tolerated regular diet.  IV: Ns running at 100ml/hr.          "

## 2019-09-14 NOTE — ED PROVIDER NOTES
History     Chief Complaint:  Fever and UTI    HPI   Giovanni Parks is a 33 year old male with a history of kidney stones and UTI who presents with dysuria. He reports that he had his gallbladder out on 8/19 and has had abdominal pain since then. He reports that he started having loose green stools 2-3 times per day since a week ago. He reports that he developed dysuria, penile and scrotal swelling, penile discharge, urinary urgency, urinary frequency, and constant urethral burning 2 days ago. He reports that his urine has been darker than normal. He reports taking Advil and Tylenol which helped relieve his pain for a while but that his pain has persistently increased. He reported developing back and leg pain yesterday. He reports that he developed constant chills, lightheadedness, nausea, loss of appetite, and fatigue this morning upon waking up. He denies vomiting or cough. He denies a history of STIs and reports a single female sexual partner. He reports that he lost weight recently and have noted that his legs have been swollen after that.    Allergies:  Hydrocodone-Acetaminophen  Protonix  Levofloxacin    Medications:    Flexeril  Deltasone  Prilosec  Zantac    Past Medical History:    Colon polyp  Biliary dyskinesia  HLD  UTI  Lumbar disc disease  MRSA, left arm  Cervical radiculopathy    Past Surgical History:    C7 disc fusion  Gallbladder removal    Family History:    HLD  HTN  Diabetes  COPD    Social History:  Smoking status: Never  Alcohol use: no    Marital Status:       Review of Systems   Respiratory: Negative for cough.    Gastrointestinal: Negative for vomiting.   Genitourinary: Positive for discharge, dysuria, frequency, penile swelling, scrotal swelling and urgency.   Musculoskeletal: Positive for back pain and myalgias.   All other systems reviewed and are negative.        Physical Exam     Patient Vitals for the past 24 hrs:   BP Temp Temp src Pulse Heart Rate Resp SpO2 Weight    09/13/19 2200 123/71 100.5  F (38.1  C) Oral 103 -- 18 97 % --   09/13/19 2030 (!) 155/80 102.5  F (39.2  C) Oral 108 -- -- 99 % --   09/13/19 2015 (!) 162/116 -- -- 108 -- -- 98 % --   09/13/19 2000 (!) 160/115 -- -- 112 -- -- 100 % --   09/13/19 1945 132/79 -- -- 112 -- -- 98 % --   09/13/19 1904 (!) 152/98 100.5  F (38.1  C) Oral 119 119 (!) 185 99 % 117 kg (257 lb 15 oz)     Physical Exam  Vital signs and nursing notes reviewed.     Constitutional: laying on gurney appears uncomfortable  HENT: Oropharynx is clear and moist  Eyes: Conjunctivae are normal bilaterally. Pupils equal  Neck: normal range of motion  Cardiovascular: tachycardic rate, regular rhythm, normal heart sounds.   Pulmonary/Chest: Effort normal and breath sounds normal. No respiratory distress.   Abdominal: Soft. No significant reproducible abdominal pain. Patient has bilateral flank area discomfort.  : no penile swelling or urethral discharge. Discomfort to palpation of the left testicular area without testicular swelling,or scrotal edema  Musculoskeletal: No joint swelling or edema.   Neurological: Alert and oriented. No focal weakness  Skin: Skin is warm and dry. No rash noted.   Psych: normal affect    Emergency Department Course   Imaging:  Radiographic findings were communicated with the patient who voiced understanding of the findings.  US Testicular and Scrotum w Doppler Ltd:   1.  Normal ultrasound of the scrotum.  Read as per radiology.    CT Abdomen pelvis with contrast:   1.  No obstructive uropathy or other acute abnormality in the abdomen or pelvis.  Read as per radiology.    Laboratory:    Blood culture: in process    UA with Microscopic: blood: moderate, albumin: 30, leukocyte esterase: large, WBC/HPF: > 182 (H), RBC/HPF: 132 (H), mucous present, o/w WNL    Urine culture: in process    CBC: WBC: 18.2 (H), HGB: 14.3, PLT: 269    BMP: Glucose 107 (H), o/w WNL (Creatinine: 1.09)    Hepatic panel: All WNL     Lipase:  178    Interventions:  1945: NS 1L IV Bolus   2116: Tylenol 1,000 mg PO  2117: Toradol 15 mg IV  2036: Tylenol 1,000 mg PO  2156: Rocephin 1 g in 100 IV infusion    Emergency Department Course:  Nursing notes and vitals reviewed. (1914) I performed an exam of the patient as documented above.     IV inserted. Medicine administered as documented above. Blood drawn. This was sent to the lab for further testing, results above.    The patient was sent for a testicular ultrasound and an abdominal CT while in the emergency department, findings above.     1958 I rechecked the patient and discussed the results of his workup thus far.     2138  I consulted with Dr. Raymond of the hospitalist services. They are in agreement to accept the patient for admission.    Findings and plan explained to the Patient who consents to admission. Discussed the patient with Dr. Raymond, who will admit the patient to a med/surg bed for further monitoring, evaluation, and treatment.    Impression & Plan    Medical Decision Making:  Giovanni Parks, a 33 year old male who presents with fever, body aches, back pain, and dysuria. On examination he was tachycardic but was normotensive. His lab tests showed leukocytosis without lactic acidosis. Urinalysis was highly suspicious for associated possible urinary tract infection, likely causing his symptomatolgy. CT imaging was done to ensure there was no evidence of associated obstructive uropathy which was thankfully negative and there was no obvious other cause of his infectious symptoms on the CT. Ultrasound, the testicles showed no orchitis or obvious epididymitis. Cultures were obtained and I started patient on rocephin. Based on his symptomatology, high fever, persistent tachycardia, I felt that he should be admitted for further evaluation and treatment. I can exclude that he is bacteremic at this point. I discussed this with the patient, he is in agreement with the plan of admission as well  as with the hospitalist Dr. Segundo lopez who agrees to accept patient.    Diagnosis:    ICD-10-CM    1. Pyelonephritis N12        Disposition:  Admitted to med/surg    Discharge Medications:  There are no discharge medications for this patient.      Chicho Ortiz  9/13/2019   [facility] EMERGENCY DEPARTMENT  Scribe Disclosure:  I, Chicho Ortiz, am serving as a scribe on 9/13/2019 at 7:14 PM to personally document services performed by Lorenzo Schuster MD based on my observations and the provider's statements to me.          Lorenzo Schuster MD  09/13/19 3261

## 2019-09-14 NOTE — PHARMACY-ADMISSION MEDICATION HISTORY
Admission medication history interview status for this patient is complete. See [facility] admission navigator for allergy information, prior to admission medications and immunization status.     Medication history interview source(s):Patient  Medication history resources (including written lists, pill bottles, clinic record):[facility] list and care everywhere  [facility]    Changes made to PTA medication list:  Added: none  Deleted: cyclobenzaprine, epipen, lorazepam, oxycodone, prednisone, senna, sumatriptan  Changed: none    Actions taken by pharmacist (provider contacted, etc):none     Additional medication history information:none    Medication reconciliation/reorder completed by provider prior to medication history? Yes    Do you take OTC medications (eg tylenol, ibuprofen, fish oil, eye/ear drops, etc)? Y    For patients on insulin therapy: N    Prior to Admission medications    Not on File

## 2019-09-14 NOTE — ED TRIAGE NOTES
Pt with 3 days hx of urinary frequency, dyrsuria, and, ? Hematuria.  Today pt c/o back ache, fever, chills, and dizziness.

## 2019-09-14 NOTE — PLAN OF CARE
Temp 99.5 at 0930 today - tylenol / advil given for general aches. Up in halls ambulating independent. IVF. PO intake good, voiding without difficulty. No scrotal edema. IV Rocephin.Tele NSR. STD, blood cx pending. Prelim UC gram - rods, susceptibility testing in progress.

## 2019-09-14 NOTE — ED NOTES
[facility]  ED Nurse Handoff Report    Giovanni Parks is a 33 year old male   ED Chief complaint: Fever and UTI  . ED Diagnosis:   Final diagnoses:   Pyelonephritis     Allergies:   Allergies   Allergen Reactions     No Known Drug Allergy      Protonix [Pantoprazole] Swelling and Difficulty breathing       Code Status: Full Code  Activity level - Baseline/Home:  Independent. Activity Level - Current:   Independent. Lift room needed: No. Bariatric: No   Needed: No   Isolation: No. Infection: Not Applicable.     Vital Signs:   Vitals:    09/13/19 1945 09/13/19 2000 09/13/19 2015 09/13/19 2030   BP: 132/79 (!) 160/115 (!) 162/116 (!) 155/80   Pulse: 112 112 108 108   Resp:       Temp:    102.5  F (39.2  C)   TempSrc:    Oral   SpO2: 98% 100% 98% 99%   Weight:           Cardiac Rhythm:  ,      Pain level: 0-10 Pain Scale: 6  Patient confused: No. Patient Falls Risk: Yes.   Elimination Status: Has voided   Patient Report - Initial Complaint: Fever, UTI symptoms. Focused Assessment: See Provider Note   Tests Performed: Labs, UA, CT. Abnormal Results: See Results.   Treatments provided: Fluids, Tylenol, Torodal, Antibiotics.   Family Comments: Not Present  OBS brochure/video discussed/provided to patient:  Yes  ED Medications:   Medications   acetaminophen (TYLENOL) tablet 500 mg (500 mg Oral Given 9/13/19 2116)   cefTRIAXone (ROCEPHIN) 1 g vial to attach to  mL bag for ADULTS or NS 50 mL bag for PEDS (has no administration in time range)   0.9% sodium chloride BOLUS (1,000 mLs Intravenous New Bag 9/13/19 1945)   ketorolac (TORADOL) injection 15 mg (15 mg Intravenous Given 9/13/19 2117)     Drips infusing:  No  For the majority of the shift, the patient's behavior Green. Interventions performed were None.     Severe Sepsis OR Septic Shock Diagnosis Present: No      ED Nurse Name/Phone Number: Mariaelena Kwok RN,   9:48 PM  RECEIVING UNIT ED HANDOFF REVIEW    Above ED Nurse Handoff Report  was reviewed: Yes  Reviewed by: Amber Jackson RN on September 13, 2019 at 10:09 PM

## 2019-09-14 NOTE — PLAN OF CARE
Tele SR.  Pt is A&O x4, independent.  Came to floor 2230 from ED.  VSS, temp 100.5.  Zofran x1 for nausea.  Pt had a box lunch and ice cream.Pt denies pain.  NS 100ml/hr, iv antibiotic Rocephin. Continue plan of care.

## 2019-09-15 VITALS
TEMPERATURE: 98.3 F | HEIGHT: 77 IN | RESPIRATION RATE: 20 BRPM | WEIGHT: 239.9 LBS | HEART RATE: 80 BPM | BODY MASS INDEX: 28.33 KG/M2 | SYSTOLIC BLOOD PRESSURE: 143 MMHG | DIASTOLIC BLOOD PRESSURE: 85 MMHG | OXYGEN SATURATION: 99 %

## 2019-09-15 LAB
BACTERIA SPEC CULT: ABNORMAL
BACTERIA SPEC CULT: ABNORMAL
C TRACH DNA SPEC QL NAA+PROBE: NEGATIVE
Lab: ABNORMAL
N GONORRHOEA DNA SPEC QL NAA+PROBE: NEGATIVE
SPECIMEN SOURCE: ABNORMAL
SPECIMEN SOURCE: NORMAL
SPECIMEN SOURCE: NORMAL

## 2019-09-15 PROCEDURE — 99238 HOSP IP/OBS DSCHRG MGMT 30/<: CPT | Performed by: INTERNAL MEDICINE

## 2019-09-15 PROCEDURE — 25000132 ZZH RX MED GY IP 250 OP 250 PS 637: Performed by: INTERNAL MEDICINE

## 2019-09-15 PROCEDURE — 25800030 ZZH RX IP 258 OP 636: Performed by: INTERNAL MEDICINE

## 2019-09-15 RX ORDER — LEVOFLOXACIN 500 MG/1
500 TABLET, FILM COATED ORAL DAILY
Qty: 10 TABLET | Refills: 0 | Status: SHIPPED | OUTPATIENT
Start: 2019-09-15 | End: 2019-09-15

## 2019-09-15 RX ORDER — LEVOFLOXACIN 500 MG/1
500 TABLET, FILM COATED ORAL DAILY
Status: DISCONTINUED | OUTPATIENT
Start: 2019-09-15 | End: 2019-09-15 | Stop reason: HOSPADM

## 2019-09-15 RX ORDER — SULFAMETHOXAZOLE/TRIMETHOPRIM 800-160 MG
1 TABLET ORAL 2 TIMES DAILY
Qty: 20 TABLET | Refills: 0 | Status: SHIPPED | OUTPATIENT
Start: 2019-09-15 | End: 2019-09-25

## 2019-09-15 RX ADMIN — SODIUM CHLORIDE, PRESERVATIVE FREE: 5 INJECTION INTRAVENOUS at 05:35

## 2019-09-15 RX ADMIN — ACETAMINOPHEN 650 MG: 325 TABLET, FILM COATED ORAL at 07:52

## 2019-09-15 RX ADMIN — IBUPROFEN 600 MG: 600 TABLET ORAL at 12:48

## 2019-09-15 ASSESSMENT — ACTIVITIES OF DAILY LIVING (ADL)
ADLS_ACUITY_SCORE: 10

## 2019-09-15 NOTE — PROVIDER NOTIFICATION
Reviewing discharge paperwork.  Pt requesting different antibiotic.   Allergy listed under [facility] chart - knee locking and ability to walk - had to go on crutches because of knee joints not working.  Dr. Branch informed.

## 2019-09-15 NOTE — PLAN OF CARE
Tele-NSR.  Pt is A&O x4, VSS.  Independent.  NS infusing  at 50mL/hr.  IV Rocephin.  Zantac x1.  Scrotum pain 4 out of 10, took Advil x1.  Voiding without difficulty.  Pt up and ambulated in hallways x2.  Possible discharge home today.  Continue plan of care.

## 2019-09-15 NOTE — PROGRESS NOTES
"Pt stating \"Today I feel worse - when I urinate, it burns, having discharge again,, back ache, dizzy.  /78 -.up in chair. Afebrile this morning.    "

## 2019-09-15 NOTE — PROGRESS NOTES
Discharge instructions reviewed with the patient/spouse. Medication instruction re:bactrim including side effects reviewed w/pt. Pt refused volunteer w/c assistance at discharge. Discharged to home with spouse and a friend.

## 2019-09-15 NOTE — DISCHARGE SUMMARY
[facility]  Hospitalist Discharge Summary       Date of Admission:  9/13/2019  Date of Discharge:  9/15/2019  Discharging Provider: Jordin Raymond DO      Discharge Diagnoses   1.  Urinary tract infection.  Symptoms initially concerning for possible pyelonephritis.  Urine culture growing E. coli.  Chlamydia and gonorrhea labs still pending.  Was initially on IV ceftriaxone.  Will now transition to oral Bactrim double strength twice daily for the next 10 days.    2.  Sepsis.  As initially evidenced by leukocytosis, tachycardia, and fever.  Source is UTI.  Sepsis resolved with fluids and IV ceftriaxone.    Follow-ups Needed After Discharge   Follow-up Appointments     Follow-up and recommended labs and tests       Follow up with primary care provider, Arnav Veliz, within 7-10 days   for hospital follow- up.  No follow up labs or test are needed.             Discharge Disposition   Discharged to home  Condition at discharge: Stable    Hospital Course     Giovanni Parsk is a 33 year old male admitted on 9/13/2019. He has a past medical history of recent cholecystectomy.  He presented to emergency room with flank pain and dysuria.  Found to have UTI and symptoms suspicious for pyelonephritis.  He was initially started on IV ceftriaxone.  Also on IV fluids.  Sepsis has resolved.  Symptoms are improved by time of discharge.  Still having occasional dysuria.  Urine culture has returned showing E. coli.  Will now transition to oral Bactrim double strength twice daily for the next 10 days.  He does need to follow-up with his regular physician in 7 to 10 days to ensure resolution of symptoms.  Also follow-up on chlamydia and gonorrhea testing but is still in the pulmonary stages at time of discharge.    Consultations This Hospital Stay   None    Code Status   Full Code    Time Spent on this Encounter   I spent 25 minutes with Mr. Parks and working on discharge on 9/15/2019.       Jordin GOLDSTEIN  Segundo,   [facility]  ______________________________________________________________________    Physical Exam   Vital Signs: Temp: 98.3  F (36.8  C) Temp src: Axillary BP: (!) 143/85 Pulse: 80 Heart Rate: 82 Resp: 20 SpO2: 99 % O2 Device: None (Room air)    Weight: 239 lbs 14.4 oz  Gen:  NAD, A&Ox3.  Eyes:  PERRL, sclera anicteric.  OP:  MMM, no lesions.  Neck:  Supple.  CV:  Regular, no murmurs.  Lung:  CTA b/l, normal effort.  Ab:  +BS, soft.  Skin:  Warm, dry to touch.  No rash.  Ext:  No pitting edema LE b/l.         Primary Care Physician   Arnav Veliz    Discharge Orders      Reason for your hospital stay    Urinary tract infection.     Follow-up and recommended labs and tests     Follow up with primary care provider, Arnav Veliz, within 7-10 days for hospital follow- up.  No follow up labs or test are needed.     Activity    Your activity upon discharge: activity as tolerated     Full Code     Diet    Follow this diet upon discharge: Regular         Discharge Medications   Current Discharge Medication List      START taking these medications    Details   levofloxacin (LEVAQUIN) 500 MG tablet Take 1 tablet (500 mg) by mouth daily for 10 days  Qty: 10 tablet, Refills: 0    Associated Diagnoses: Sepsis due to Escherichia coli (H)           Allergies   Allergies   Allergen Reactions     No Known Drug Allergy      Protonix [Pantoprazole] Swelling and Difficulty breathing

## 2019-09-19 LAB
BACTERIA SPEC CULT: NO GROWTH
BACTERIA SPEC CULT: NO GROWTH
Lab: NORMAL
Lab: NORMAL
SPECIMEN SOURCE: NORMAL
SPECIMEN SOURCE: NORMAL

## 2020-02-10 ENCOUNTER — APPOINTMENT (OUTPATIENT)
Dept: CT IMAGING | Facility: CLINIC | Age: 35
End: 2020-02-10
Attending: EMERGENCY MEDICINE
Payer: COMMERCIAL

## 2020-02-10 ENCOUNTER — HOSPITAL ENCOUNTER (EMERGENCY)
Facility: CLINIC | Age: 35
Discharge: HOME | End: 2020-02-10
Attending: EMERGENCY MEDICINE | Admitting: EMERGENCY MEDICINE
Payer: COMMERCIAL

## 2020-02-10 VITALS
WEIGHT: 231 LBS | OXYGEN SATURATION: 99 % | BODY MASS INDEX: 27.28 KG/M2 | RESPIRATION RATE: 20 BRPM | SYSTOLIC BLOOD PRESSURE: 137 MMHG | HEIGHT: 77 IN | TEMPERATURE: 98.4 F | HEART RATE: 85 BPM | DIASTOLIC BLOOD PRESSURE: 94 MMHG

## 2020-02-10 DIAGNOSIS — R10.9 ABDOMINAL PAIN, UNSPECIFIED ABDOMINAL LOCATION: ICD-10-CM

## 2020-02-10 DIAGNOSIS — R11.0 NAUSEA: ICD-10-CM

## 2020-02-10 LAB
ALBUMIN SERPL-MCNC: 4.5 G/DL (ref 3.4–5)
ALP SERPL-CCNC: 51 U/L (ref 40–150)
ALT SERPL W P-5'-P-CCNC: 24 U/L (ref 0–70)
ANION GAP SERPL CALCULATED.3IONS-SCNC: 3 MMOL/L (ref 3–14)
AST SERPL W P-5'-P-CCNC: 14 U/L (ref 0–45)
BASOPHILS # BLD AUTO: 0 10E9/L (ref 0–0.2)
BASOPHILS NFR BLD AUTO: 0.4 %
BILIRUB SERPL-MCNC: 0.7 MG/DL (ref 0.2–1.3)
BUN SERPL-MCNC: 11 MG/DL (ref 7–30)
CALCIUM SERPL-MCNC: 9.2 MG/DL (ref 8.5–10.1)
CHLORIDE SERPL-SCNC: 105 MMOL/L (ref 94–109)
CO2 SERPL-SCNC: 30 MMOL/L (ref 20–32)
CREAT SERPL-MCNC: 0.97 MG/DL (ref 0.66–1.25)
DIFFERENTIAL METHOD BLD: NORMAL
EOSINOPHIL # BLD AUTO: 0 10E9/L (ref 0–0.7)
EOSINOPHIL NFR BLD AUTO: 0.9 %
ERYTHROCYTE [DISTWIDTH] IN BLOOD BY AUTOMATED COUNT: 12 % (ref 10–15)
GFR SERPL CREATININE-BSD FRML MDRD: >90 ML/MIN/{1.73_M2}
GLUCOSE SERPL-MCNC: 91 MG/DL (ref 70–99)
HCT VFR BLD AUTO: 41.9 % (ref 40–53)
HGB BLD-MCNC: 14.2 G/DL (ref 13.3–17.7)
IMM GRANULOCYTES # BLD: 0 10E9/L (ref 0–0.4)
IMM GRANULOCYTES NFR BLD: 0.2 %
LIPASE SERPL-CCNC: 171 U/L (ref 73–393)
LYMPHOCYTES # BLD AUTO: 1.5 10E9/L (ref 0.8–5.3)
LYMPHOCYTES NFR BLD AUTO: 33.3 %
MCH RBC QN AUTO: 29.6 PG (ref 26.5–33)
MCHC RBC AUTO-ENTMCNC: 33.9 G/DL (ref 31.5–36.5)
MCV RBC AUTO: 87 FL (ref 78–100)
MONOCYTES # BLD AUTO: 0.4 10E9/L (ref 0–1.3)
MONOCYTES NFR BLD AUTO: 8.1 %
NEUTROPHILS # BLD AUTO: 2.6 10E9/L (ref 1.6–8.3)
NEUTROPHILS NFR BLD AUTO: 57.1 %
NRBC # BLD AUTO: 0 10*3/UL
NRBC BLD AUTO-RTO: 0 /100
PLATELET # BLD AUTO: 270 10E9/L (ref 150–450)
POTASSIUM SERPL-SCNC: 3.9 MMOL/L (ref 3.4–5.3)
PROT SERPL-MCNC: 7.4 G/DL (ref 6.8–8.8)
RBC # BLD AUTO: 4.8 10E12/L (ref 4.4–5.9)
SODIUM SERPL-SCNC: 138 MMOL/L (ref 133–144)
WBC # BLD AUTO: 4.6 10E9/L (ref 4–11)

## 2020-02-10 PROCEDURE — 25000128 H RX IP 250 OP 636: Performed by: EMERGENCY MEDICINE

## 2020-02-10 PROCEDURE — 25000125 ZZHC RX 250: Performed by: EMERGENCY MEDICINE

## 2020-02-10 PROCEDURE — 85025 COMPLETE CBC W/AUTO DIFF WBC: CPT | Performed by: EMERGENCY MEDICINE

## 2020-02-10 PROCEDURE — 96375 TX/PRO/DX INJ NEW DRUG ADDON: CPT

## 2020-02-10 PROCEDURE — 96361 HYDRATE IV INFUSION ADD-ON: CPT

## 2020-02-10 PROCEDURE — 74177 CT ABD & PELVIS W/CONTRAST: CPT

## 2020-02-10 PROCEDURE — 80053 COMPREHEN METABOLIC PANEL: CPT | Performed by: EMERGENCY MEDICINE

## 2020-02-10 PROCEDURE — 83690 ASSAY OF LIPASE: CPT | Performed by: EMERGENCY MEDICINE

## 2020-02-10 PROCEDURE — 25800030 ZZH RX IP 258 OP 636: Performed by: EMERGENCY MEDICINE

## 2020-02-10 PROCEDURE — 99285 EMERGENCY DEPT VISIT HI MDM: CPT | Mod: 25

## 2020-02-10 PROCEDURE — 96374 THER/PROPH/DIAG INJ IV PUSH: CPT | Mod: 59

## 2020-02-10 RX ORDER — ONDANSETRON 2 MG/ML
4 INJECTION INTRAMUSCULAR; INTRAVENOUS
Status: COMPLETED | OUTPATIENT
Start: 2020-02-10 | End: 2020-02-10

## 2020-02-10 RX ORDER — METOCLOPRAMIDE 10 MG/1
10 TABLET ORAL 4 TIMES DAILY PRN
Qty: 20 TABLET | Refills: 0 | Status: SHIPPED | OUTPATIENT
Start: 2020-02-10

## 2020-02-10 RX ORDER — SODIUM CHLORIDE 9 MG/ML
1000 INJECTION, SOLUTION INTRAVENOUS CONTINUOUS
Status: DISCONTINUED | OUTPATIENT
Start: 2020-02-10 | End: 2020-02-10 | Stop reason: HOSPADM

## 2020-02-10 RX ORDER — DICYCLOMINE HYDROCHLORIDE 10 MG/1
CAPSULE ORAL
COMMUNITY
Start: 2019-12-31

## 2020-02-10 RX ORDER — MORPHINE SULFATE 4 MG/ML
4 INJECTION, SOLUTION INTRAMUSCULAR; INTRAVENOUS
Status: COMPLETED | OUTPATIENT
Start: 2020-02-10 | End: 2020-02-10

## 2020-02-10 RX ORDER — IOPAMIDOL 755 MG/ML
500 INJECTION, SOLUTION INTRAVASCULAR ONCE
Status: COMPLETED | OUTPATIENT
Start: 2020-02-10 | End: 2020-02-10

## 2020-02-10 RX ADMIN — ONDANSETRON 4 MG: 2 INJECTION INTRAMUSCULAR; INTRAVENOUS at 11:15

## 2020-02-10 RX ADMIN — SODIUM CHLORIDE 1000 ML: 9 INJECTION, SOLUTION INTRAVENOUS at 11:07

## 2020-02-10 RX ADMIN — MORPHINE SULFATE 4 MG: 4 INJECTION INTRAVENOUS at 11:15

## 2020-02-10 RX ADMIN — IOPAMIDOL 100 ML: 755 INJECTION, SOLUTION INTRAVENOUS at 11:43

## 2020-02-10 RX ADMIN — SODIUM CHLORIDE 65 ML: 9 INJECTION, SOLUTION INTRAVENOUS at 11:44

## 2020-02-10 ASSESSMENT — ENCOUNTER SYMPTOMS
HEMATURIA: 0
DYSURIA: 0
BLOOD IN STOOL: 0
NAUSEA: 1
ABDOMINAL PAIN: 1
APPETITE CHANGE: 1
DIARRHEA: 1
LIGHT-HEADEDNESS: 1

## 2020-02-10 ASSESSMENT — MIFFLIN-ST. JEOR: SCORE: 2110.19

## 2020-02-10 NOTE — ED TRIAGE NOTES
Patient presents with complaints of abdomen pain for the past two weeks. Patient is on left side of abdomen and got worse today. Patient states he is nauseated as well. ABC intact without need for intervention at this time.

## 2020-02-10 NOTE — DISCHARGE INSTRUCTIONS
Discharge Instructions  Abdominal Pain    Abdominal pain (belly pain) can be caused by many things. Your evaluation today does not show the exact cause for your pain. Your provider today has decided that it is unlikely your pain is due to a life threatening problem, or a problem requiring surgery or hospital admission. Sometimes those problems cannot be found right away, so it is very important that you follow up as directed.  Sometimes only the changes which occur over time allow the cause of your pain to be found.    Generally, every Emergency Department visit should have a follow-up clinic visit with either a primary or a specialty clinic/provider. Please follow-up as instructed by your emergency provider today. With abdominal pain, we often recommend very close follow-up, such as the following day.    ADULTS:  Return to the Emergency Department right away if:    You get an oral temperature above 102oF or as directed by your provider.  You have blood in your stools. This may be bright red or appear as black, tarry stools.    You keep vomiting (throwing up) or cannot drink liquids.  You see blood when you vomit.   You cannot have a bowel movement or you cannot pass gas.  Your stomach gets bloated or bigger.  Your skin or the whites of your eyes look yellow.  You faint.  You have bloody, frequent or painful urination (peeing).  You have new symptoms or anything that worries you.    CHILDREN:  Return to the Emergency Department right away if your child has any of the above-listed symptoms or the following:    Pushes your hand away or screams/cries when his/her belly is touched.  You notice your child is very fussy or weak.  Your child is very tired and is too tired to eat or drink.  Your child is dehydrated.  Signs of dehydration can be:  Significant change in the amount of wet diapers/urine.  Your infant or child starts to have dry mouth and lips, or no saliva (spit) or tears.    PREGNANT WOMEN:  Return to the  Emergency Department right away if you have any of the above-listed symptoms or the following:    You have bleeding, leaking fluid or passing tissue from the vagina.  You have worse pain or cramping, or pain in your shoulder or back.  You have vomiting that will not stop.  You have a temperature of 100oF or more.  Your baby is not moving as much as usual.  You faint.  You get a bad headache with or without eye problems and abdominal pain.  You have a seizure.  You have unusual discharge from your vagina and abdominal pain.    Abdominal pain is pretty common during pregnancy.  Your pain may or may not be related to your pregnancy. You should follow-up closely with your OB provider so they can evaluate you and your baby.  Until you follow-up with your regular provider, do the following:     Avoid sex and do not put anything in your vagina.  Drink clear fluids.  Only take medications approved by your provider.    MORE INFORMATION:    Appendicitis:  A possible cause of abdominal pain in any person who still has their appendix is acute appendicitis. Appendicitis is often hard to diagnose.  Testing does not always rule out early appendicitis or other causes of abdominal pain. Close follow-up with your provider and re-evaluations may be needed to figure out the reason for your abdominal pain.    Follow-up:  It is very important that you make an appointment with your clinic and go to the appointment.  If you do not follow-up with your primary provider, it may result in missing an important development which could result in permanent injury or disability and/or lasting pain.  If there is any problem keeping your appointment, call your provider or return to the Emergency Department.    Medications:  Take your medications as directed by your provider today.  Before using over-the-counter medications, ask your provider and make sure to take the medications as directed.  If you have any questions about medications, ask your  "provider.    Diet:  Resume your normal diet as much as possible, but do not eat fried, fatty or spicy foods while you have pain.  Do not drink alcohol or have caffeine.  Do not smoke tobacco.    Probiotics: If you have been given an antibiotic, you may want to also take a probiotic pill or eat yogurt with live cultures. Probiotics have \"good bacteria\" to help your intestines stay healthy. Studies have shown that probiotics help prevent diarrhea (loose stools) and other intestine problems (including C. diff infection) when you take antibiotics. You can buy these without a prescription in the pharmacy section of the store.     If you were given a prescription for medicine here today, be sure to read all of the information (including the package insert) that comes with your prescription.  This will include important information about the medicine, its side effects, and any warnings that you need to know about.  The pharmacist who fills the prescription can provide more information and answer questions you may have about the medicine.  If you have questions or concerns that the pharmacist cannot address, please call or return to the Emergency Department.       Remember that you can always come back to the Emergency Department if you are not able to see your regular provider in the amount of time listed above, if you get any new symptoms, or if there is anything that worries you.     There are signs of possible ileus and fluid filled small bowel that is nonspecific but given your improvement in symptoms seems unlikely to represent obstruction or acute surgical process. Please return if symptoms worsen.  "

## 2020-02-10 NOTE — ED PROVIDER NOTES
"  History     Chief Complaint:  Abdominal Pain    HPI   Giovanni Parks is a 34 year old male who presents with abdominal pain. The patient first developed lower left-sided abdominal pain 2 weeks ago, which has continued to worsen. He notes he has been eating less as it causes nausea afterwards. He notes he became more lightheaded today with minimal bending and movement so presents to the ED. He denies syncope. He denies a spinning sensation or headache with his dizziness. No vision changes, sensation changes, or weakness. He denies vomiting. The patient also had an episode of diarrhea yesterday though denies bloody stool, dysuria, hematuria, testicular pain or swelling, penile discharge, or history of kidney stones.     Allergies:  Protonix [Pantoprazole]   levofloxacin    Medications:    Omeprazole  dicyclomine    Past Medical History:    History of sepsis   Hyperlipidemia   Lumbar and cervical disc disease    Past Surgical History:    cholecystectomy    Family History:    Father - hyperlipidemia, hypertension   Mother - diabetes, hypertension, COPD    Social History:  Tobacco use: negative   Alcohol use: negative   PCP: Arnav Veliz     Review of Systems   Constitutional: Positive for appetite change.   Gastrointestinal: Positive for abdominal pain, diarrhea and nausea. Negative for blood in stool.   Genitourinary: Negative for discharge, dysuria, hematuria, scrotal swelling and testicular pain.   Neurological: Positive for light-headedness.   All other systems reviewed and are negative.      Physical Exam     Patient Vitals for the past 24 hrs:   BP Temp Pulse Resp SpO2 Height Weight   02/10/20 0959 (!) 144/94 98.4  F (36.9  C) 84 18 100 % 1.956 m (6' 5\") 104.8 kg (231 lb)        Physical Exam  General: Adult male sitting upright  Eyes: PERRL, Conjunctive within normal limits. No scleral icterus.  ENT: Moist mucous membranes, oropharynx clear.   CV: Normal S1S2, no murmur, rub or gallop. Regular rate and " rhythm  Resp: Clear to auscultation bilaterally, no wheezes, rales or rhonchi. Normal respiratory effort.  GI: Abdomen is soft and nondistended. RLQ tenderness to palpation. Very mild LLQ tenderness to palpation. No palpable masses. No rebound or guarding.  MSK: No edema. Nontender. Normal active range of motion.  Skin: Warm and dry. No rashes or lesions or ecchymoses on visible skin.  Neuro: Alert and oriented. Responds appropriately to all questions and commands. No focal findings appreciated. Normal muscle tone.  Psych: Normal mood and affect. Pleasant.      Emergency Department Course     Imaging:  Radiology findings were communicated with the patient who voiced understanding of the findings.    CT abdomen pelvis w/ IV contrast:   1. A few minimally distended proximal to mid small bowel loops could  relate to a mild ileus.  2. No acute abnormality otherwise seen as per radiology.     Laboratory:  Laboratory findings were communicated with the patient who voiced understanding of the findings.    CBC: WBC 4.6, HGB 14.2,    CMP:   WNL (Creatinine 0.97)  Lipase: 171    Interventions:  1107 NS 1 L IV  1115 Zofran 4 mg IV   Morphine 4 mg IV     Emergency Department Course:  Past medical records, nursing notes, and vitals reviewed.    1048 I performed an exam of the patient as documented above.     IV was inserted and blood was drawn for laboratory testing, results above.  The patient was sent for an abdomen/ pelvis CT while in the emergency department, results above.     1237 Patient rechecked and updated. Feeling improved. Tolerated PO.    I personally reviewed the laboratory and imaging results with the Patient and answered all related questions prior to discharge. I prescribed Reglan.      Impression & Plan     Medical Decision Making:  Giovanni Parks is a 34 year old male who presents to the emergency department today with abdominal pain. Differential diagnosis includes pancreatitis, bowel obstruction,  AAA, among others. The exam was relatively benign, but given his persistent symptoms, the patient underwent a CT abdomen/pelvis. On recheck, he is tolerating POs and has normal and stable vital signs. Abdominal recheck is benign. There is no localization to the groin to suggest torsion.  There is no localization to the epigastrium to suggest ACS/AMI.  I discussed at length the unclear etiology of the pain and possibilities including gastritis, GERD, peptic ulcer disease among others. I also noted biliary dyskinesia could be a cause although this would typically cause more localized pain to the right upper quadrant or epigastrium. His CT scan shows some possible mildly dilated fluid-filled small bowel which could represent ileus or enteritis in the right clinical picture. He did note diarrhea yesterday, so potentially this would represent enteritis. The patient, given his overall well-appearance and resolution of symptoms even with eating, seems unlikely to benefit from admission or have evidence of an obstruction. I feel comfortable discharging him home at this time. He's aware of the need to follow-up with his primary care provider and/or gastroenterologist for possible colonoscopy or further assessment if his symptoms persist. All questions were answered prior to discharge.     Discharge Diagnosis:    ICD-10-CM    1. Abdominal pain, unspecified abdominal location R10.9    2. Nausea R11.0      Disposition:  Discharged to home     Discharge Medications:  New Prescriptions    METOCLOPRAMIDE (REGLAN) 10 MG TABLET    Take 1 tablet (10 mg) by mouth 4 times daily as needed (nausea, vomiting)       Scribe Disclosure:  Maile ALCAZAR, am serving as a scribe at 10:48 AM on 2/10/2020 to document services personally performed by Danuta Carrera MD based on my observations and the provider's statements to me.       Danuta Carrera MD  02/13/20 0751

## 2020-02-10 NOTE — ED AVS SNAPSHOT
[facility] Department  [address]  [facility] [address]  Phone:  [phone]  Fax:  [phone]                                    Giovanni Parks   MRN: [id number]    Department:  [facility] Emergency Department   Date of Visit:  2/10/2020           After Visit Summary Signature Page    I have received my discharge instructions, and my questions have been answered. I have discussed any challenges I see with this plan with the nurse or doctor.    ..........................................................................................................................................  Patient/Patient Representative Signature      ..........................................................................................................................................  Patient Representative Print Name and Relationship to Patient    ..................................................               ................................................  Date                                   Time    ..........................................................................................................................................  Reviewed by Signature/Title    ...................................................              ..............................................  Date                                               Time          22EPIC Rev 08/18

## 2021-05-27 ENCOUNTER — RECORDS - HEALTHEAST (OUTPATIENT)
Dept: ADMINISTRATIVE | Facility: CLINIC | Age: 36
End: 2021-05-27

## 2021-05-28 ENCOUNTER — RECORDS - HEALTHEAST (OUTPATIENT)
Dept: ADMINISTRATIVE | Facility: CLINIC | Age: 36
End: 2021-05-28